# Patient Record
Sex: FEMALE | URBAN - METROPOLITAN AREA
[De-identification: names, ages, dates, MRNs, and addresses within clinical notes are randomized per-mention and may not be internally consistent; named-entity substitution may affect disease eponyms.]

---

## 2021-08-28 ENCOUNTER — TELEPHONE ENCOUNTER (OUTPATIENT)
Dept: URBAN - METROPOLITAN AREA CLINIC 13 | Facility: CLINIC | Age: 46
End: 2021-08-28

## 2021-08-29 ENCOUNTER — TELEPHONE ENCOUNTER (OUTPATIENT)
Dept: URBAN - METROPOLITAN AREA CLINIC 13 | Facility: CLINIC | Age: 46
End: 2021-08-29

## 2025-02-10 ENCOUNTER — LAB OUTSIDE AN ENCOUNTER (OUTPATIENT)
Dept: URBAN - METROPOLITAN AREA CLINIC 115 | Facility: CLINIC | Age: 50
End: 2025-02-10

## 2025-02-10 ENCOUNTER — TELEPHONE ENCOUNTER (OUTPATIENT)
Dept: URBAN - METROPOLITAN AREA CLINIC 115 | Facility: CLINIC | Age: 50
End: 2025-02-10

## 2025-02-18 ENCOUNTER — TELEPHONE ENCOUNTER (OUTPATIENT)
Dept: URBAN - METROPOLITAN AREA CLINIC 54 | Facility: CLINIC | Age: 50
End: 2025-02-18

## 2025-02-21 ENCOUNTER — OFFICE VISIT (OUTPATIENT)
Dept: URBAN - METROPOLITAN AREA MEDICAL CENTER 24 | Facility: MEDICAL CENTER | Age: 50
End: 2025-02-21

## 2025-02-24 ENCOUNTER — TELEPHONE ENCOUNTER (OUTPATIENT)
Dept: URBAN - METROPOLITAN AREA CLINIC 115 | Facility: CLINIC | Age: 50
End: 2025-02-24

## 2025-02-28 ENCOUNTER — DASHBOARD ENCOUNTERS (OUTPATIENT)
Age: 50
End: 2025-02-28

## 2025-02-28 ENCOUNTER — LAB OUTSIDE AN ENCOUNTER (OUTPATIENT)
Dept: URBAN - METROPOLITAN AREA CLINIC 115 | Facility: CLINIC | Age: 50
End: 2025-02-28

## 2025-02-28 ENCOUNTER — TELEPHONE ENCOUNTER (OUTPATIENT)
Dept: URBAN - METROPOLITAN AREA CLINIC 115 | Facility: CLINIC | Age: 50
End: 2025-02-28

## 2025-02-28 ENCOUNTER — OFFICE VISIT (OUTPATIENT)
Dept: URBAN - METROPOLITAN AREA CLINIC 115 | Facility: CLINIC | Age: 50
End: 2025-02-28

## 2025-02-28 VITALS
HEART RATE: 83 BPM | DIASTOLIC BLOOD PRESSURE: 73 MMHG | WEIGHT: 132.4 LBS | HEIGHT: 64 IN | BODY MASS INDEX: 22.61 KG/M2 | SYSTOLIC BLOOD PRESSURE: 123 MMHG | TEMPERATURE: 97.5 F

## 2025-02-28 PROBLEM — 1082431000119108: Status: ACTIVE | Noted: 2025-02-28

## 2025-02-28 RX ORDER — MELOXICAM 15 MG/1
TAKE 1 TABLET BY MOUTH EVERY OTHER MORNING WITH FOOD AS NEEDED TABLET ORAL
Qty: 15 EACH | Refills: 2 | Status: ACTIVE | COMMUNITY

## 2025-02-28 RX ORDER — ALPRAZOLAM 0.5 MG/1
TABLET ORAL
Qty: 60 TABLET | Status: ACTIVE | COMMUNITY

## 2025-02-28 RX ORDER — OXYCODONE HYDROCHLORIDE 20 MG/1
TAKE 1/2 TABLET BY MOUTH EVERY 6 HOURS AS NEEDED FOR BREAKTHROUGH PAIN TABLET ORAL
Qty: 60 EACH | Refills: 0 | Status: ACTIVE | COMMUNITY

## 2025-02-28 RX ORDER — MORPHINE SULFATE 30 MG/1
TAKE 2 TABLETS BY MOUTH IN THE MORNING AND 1 IN THE EVENING TABLET, FILM COATED, EXTENDED RELEASE ORAL
Qty: 90 EACH | Refills: 0 | Status: ACTIVE | COMMUNITY

## 2025-02-28 RX ORDER — AMLODIPINE BESYLATE 5 MG/1
TAKE 1 TABLET BY MOUTH EVERY MORNING FOR BLOOD PRESSURE TABLET ORAL
Qty: 90 EACH | Refills: 0 | Status: ACTIVE | COMMUNITY

## 2025-02-28 RX ORDER — LEVETIRACETAM 500 MG/1
TAKE 1 AND 1/2 TABLETS BY MOUTH TWICE DAILY TABLET, FILM COATED ORAL
Qty: 270 EACH | Refills: 2 | Status: ACTIVE | COMMUNITY

## 2025-02-28 RX ORDER — MELOXICAM 15 MG/1
TABLET ORAL
Qty: 15 TABLET | Status: ACTIVE | COMMUNITY

## 2025-02-28 NOTE — HPI-TODAY'S VISIT:
Chr pain, reports worse epig pain since procedure. s/p EUS and ERCP w Dr. 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 PD 3mm, CBD 10mm. 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

## 2025-03-04 ENCOUNTER — TELEPHONE ENCOUNTER (OUTPATIENT)
Dept: URBAN - METROPOLITAN AREA MEDICAL CENTER 28 | Facility: MEDICAL CENTER | Age: 50
End: 2025-03-04

## 2025-03-27 ENCOUNTER — OFFICE VISIT (OUTPATIENT)
Dept: URBAN - METROPOLITAN AREA MEDICAL CENTER 24 | Facility: MEDICAL CENTER | Age: 50
End: 2025-03-27

## 2025-03-31 ENCOUNTER — TELEPHONE ENCOUNTER (OUTPATIENT)
Dept: URBAN - METROPOLITAN AREA CLINIC 115 | Facility: CLINIC | Age: 50
End: 2025-03-31

## 2025-04-01 ENCOUNTER — OFFICE VISIT (OUTPATIENT)
Dept: URBAN - METROPOLITAN AREA CLINIC 115 | Facility: CLINIC | Age: 50
End: 2025-04-01
Payer: OTHER GOVERNMENT

## 2025-04-01 DIAGNOSIS — R10.13 EPIGASTRIC PAIN: ICD-10-CM

## 2025-04-01 DIAGNOSIS — G89.29 OTHER CHRONIC PAIN: ICD-10-CM

## 2025-04-01 PROBLEM — 82423001: Status: ACTIVE | Noted: 2025-04-01

## 2025-04-01 PROCEDURE — 99213 OFFICE O/P EST LOW 20 MIN: CPT | Performed by: INTERNAL MEDICINE

## 2025-04-01 RX ORDER — OXYCODONE HYDROCHLORIDE 20 MG/1
TAKE 1/2 TABLET BY MOUTH EVERY 6 HOURS AS NEEDED FOR BREAKTHROUGH PAIN TABLET ORAL
Qty: 60 EACH | Refills: 0 | Status: ACTIVE | COMMUNITY

## 2025-04-01 RX ORDER — MORPHINE SULFATE 30 MG/1
TAKE 2 TABLETS BY MOUTH IN THE MORNING AND 1 IN THE EVENING TABLET, FILM COATED, EXTENDED RELEASE ORAL
Qty: 90 EACH | Refills: 0 | Status: ACTIVE | COMMUNITY

## 2025-04-01 RX ORDER — AMLODIPINE BESYLATE 5 MG/1
TAKE 1 TABLET BY MOUTH EVERY MORNING FOR BLOOD PRESSURE TABLET ORAL
Qty: 90 EACH | Refills: 0 | Status: ACTIVE | COMMUNITY

## 2025-04-01 RX ORDER — MELOXICAM 15 MG/1
TAKE 1 TABLET BY MOUTH EVERY OTHER MORNING WITH FOOD AS NEEDED TABLET ORAL
Qty: 15 EACH | Refills: 2 | Status: ACTIVE | COMMUNITY

## 2025-04-01 RX ORDER — ALPRAZOLAM 0.5 MG/1
TABLET ORAL
Qty: 60 TABLET | Status: ACTIVE | COMMUNITY

## 2025-04-01 RX ORDER — MELOXICAM 15 MG/1
TABLET ORAL
Qty: 15 TABLET | Status: ACTIVE | COMMUNITY

## 2025-04-01 RX ORDER — LEVETIRACETAM 500 MG/1
TAKE 1 AND 1/2 TABLETS BY MOUTH TWICE DAILY TABLET, FILM COATED ORAL
Qty: 270 EACH | Refills: 2 | Status: ACTIVE | COMMUNITY

## 2025-04-01 NOTE — HPI-TODAY'S VISIT:
ERCP 3/27/29 w julio Sarmiento CBD stones removed, stents removed, ampulla bx inflammation only, benign. Prior hx: Chr pain, reports worse epig pain since procedure. s/p EUS and ERCP w Dr. 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 PD 3mm, CBD 10mm. 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

## 2025-04-02 LAB
A/G RATIO: 1.5
ALBUMIN: 4.4
ALKALINE PHOSPHATASE: 161
ALT (SGPT): 15
AST (SGOT): 13
BILIRUBIN, TOTAL: 0.6
BUN/CREATININE RATIO: (no result)
BUN: 12
CALCIUM: 10.1
CARBON DIOXIDE, TOTAL: 28
CHLORIDE: 101
CREATININE: 0.81
EGFR: 88
GLOBULIN, TOTAL: 3
GLUCOSE: 84
HEMATOCRIT: 38.8
HEMOGLOBIN: 13
LIPASE: 9
MCH: 29.5
MCHC: 33.5
MCV: 88
MPV: 10
PLATELET COUNT: 412
POTASSIUM: 4
PROTEIN, TOTAL: 7.4
RDW: 12.7
RED BLOOD CELL COUNT: 4.41
SODIUM: 139
WHITE BLOOD CELL COUNT: 9.2

## 2025-04-04 ENCOUNTER — TELEPHONE ENCOUNTER (OUTPATIENT)
Dept: URBAN - METROPOLITAN AREA CLINIC 115 | Facility: CLINIC | Age: 50
End: 2025-04-04

## 2025-05-23 ENCOUNTER — OFFICE VISIT (OUTPATIENT)
Dept: URBAN - METROPOLITAN AREA CLINIC 82 | Facility: CLINIC | Age: 50
End: 2025-05-23

## 2025-05-23 RX ORDER — AMLODIPINE BESYLATE 5 MG/1
TAKE 1 TABLET BY MOUTH EVERY MORNING FOR BLOOD PRESSURE TABLET ORAL
Qty: 90 EACH | Refills: 0 | Status: ACTIVE | COMMUNITY

## 2025-05-23 RX ORDER — MELOXICAM 15 MG/1
TAKE 1 TABLET BY MOUTH EVERY OTHER MORNING WITH FOOD AS NEEDED TABLET ORAL
Qty: 15 EACH | Refills: 2 | Status: ACTIVE | COMMUNITY

## 2025-05-23 RX ORDER — ALPRAZOLAM 0.5 MG/1
TABLET ORAL
Qty: 60 TABLET | Status: ACTIVE | COMMUNITY

## 2025-05-23 RX ORDER — MELOXICAM 15 MG/1
TABLET ORAL
Qty: 15 TABLET | Status: ACTIVE | COMMUNITY

## 2025-05-23 RX ORDER — OXYCODONE HYDROCHLORIDE 20 MG/1
TAKE 1/2 TABLET BY MOUTH EVERY 6 HOURS AS NEEDED FOR BREAKTHROUGH PAIN TABLET ORAL
Qty: 60 EACH | Refills: 0 | Status: ACTIVE | COMMUNITY

## 2025-05-23 RX ORDER — MORPHINE SULFATE 30 MG/1
TAKE 2 TABLETS BY MOUTH IN THE MORNING AND 1 IN THE EVENING TABLET, FILM COATED, EXTENDED RELEASE ORAL
Qty: 90 EACH | Refills: 0 | Status: ACTIVE | COMMUNITY

## 2025-05-23 RX ORDER — LEVETIRACETAM 500 MG/1
TAKE 1 AND 1/2 TABLETS BY MOUTH TWICE DAILY TABLET, FILM COATED ORAL
Qty: 270 EACH | Refills: 2 | Status: ACTIVE | COMMUNITY

## 2025-06-10 ENCOUNTER — OFFICE VISIT (OUTPATIENT)
Dept: URBAN - METROPOLITAN AREA CLINIC 115 | Facility: CLINIC | Age: 50
End: 2025-06-10
Payer: OTHER GOVERNMENT

## 2025-06-10 ENCOUNTER — LAB OUTSIDE AN ENCOUNTER (OUTPATIENT)
Dept: URBAN - METROPOLITAN AREA CLINIC 115 | Facility: CLINIC | Age: 50
End: 2025-06-10

## 2025-06-10 DIAGNOSIS — R74.8 ELEVATED ALKALINE PHOSPHATASE LEVEL: ICD-10-CM

## 2025-06-10 DIAGNOSIS — K83.8 DILATED CBD, ACQUIRED: ICD-10-CM

## 2025-06-10 DIAGNOSIS — Z12.11 COLON CANCER SCREENING: ICD-10-CM

## 2025-06-10 DIAGNOSIS — R93.5 ABNORMAL MRI OF ABDOMEN: ICD-10-CM

## 2025-06-10 DIAGNOSIS — G62.9 NEUROPATHY: ICD-10-CM

## 2025-06-10 DIAGNOSIS — R10.13 EPIGASTRIC PAIN: ICD-10-CM

## 2025-06-10 PROBLEM — 386033004: Status: ACTIVE | Noted: 2025-06-10

## 2025-06-10 PROCEDURE — 99213 OFFICE O/P EST LOW 20 MIN: CPT | Performed by: INTERNAL MEDICINE

## 2025-06-10 RX ORDER — MELOXICAM 15 MG/1
TABLET ORAL
Qty: 15 TABLET | COMMUNITY

## 2025-06-10 RX ORDER — ALPRAZOLAM 0.5 MG/1
TABLET ORAL
Qty: 60 TABLET | COMMUNITY

## 2025-06-10 RX ORDER — AMITRIPTYLINE HYDROCHLORIDE 25 MG/1
1 TABLET AT BEDTIME TABLET, FILM COATED ORAL ONCE A DAY
Qty: 30 | Refills: 1 | OUTPATIENT
Start: 2025-06-10

## 2025-06-10 RX ORDER — OXYCODONE HYDROCHLORIDE 20 MG/1
TAKE 1/2 TABLET BY MOUTH EVERY 6 HOURS AS NEEDED FOR BREAKTHROUGH PAIN TABLET ORAL
Qty: 60 EACH | Refills: 0 | COMMUNITY

## 2025-06-10 RX ORDER — MELOXICAM 15 MG/1
TAKE 1 TABLET BY MOUTH EVERY OTHER MORNING WITH FOOD AS NEEDED TABLET ORAL
Qty: 15 EACH | Refills: 2 | COMMUNITY

## 2025-06-10 RX ORDER — MORPHINE SULFATE 30 MG/1
TAKE 2 TABLETS BY MOUTH IN THE MORNING AND 1 IN THE EVENING TABLET, FILM COATED, EXTENDED RELEASE ORAL
Qty: 90 EACH | Refills: 0 | COMMUNITY

## 2025-06-10 RX ORDER — LEVETIRACETAM 500 MG/1
TAKE 1 AND 1/2 TABLETS BY MOUTH TWICE DAILY TABLET, FILM COATED ORAL
Qty: 270 EACH | Refills: 2 | COMMUNITY

## 2025-06-10 RX ORDER — SOD SULF/POT CHLORIDE/MAG SULF 1.479 G
12 TABLETS THE FIRST DOSE THE EVENING BEFORE AND SECOND DOSE THE MORNING OF COLONOSCOPY TABLET ORAL TWICE A DAY
Qty: 24 | OUTPATIENT
Start: 2025-06-10 | End: 2025-06-11

## 2025-06-10 RX ORDER — AMLODIPINE BESYLATE 5 MG/1
TAKE 1 TABLET BY MOUTH EVERY MORNING FOR BLOOD PRESSURE TABLET ORAL
Qty: 90 EACH | Refills: 0 | COMMUNITY

## 2025-06-10 NOTE — HPI-TODAY'S VISIT:
Psychiatric disease worse, seeing psychiatry. persistent periumb abd pain. sees pain mgmt for chr pains. tingling diffusely, neuropathy. Labs, slight elevation in alkaline phosphatase and platelets. ERCP 3/27/29 w julio Sarmiento CBD stones removed, stents removed, ampulla bx inflammation only, benign. Prior hx: Chr pain, reports worse epig pain since procedure. s/p EUS and ERCP w Dr. 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 PD 3mm, CBD 10mm. 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

## 2025-06-13 ENCOUNTER — OFFICE VISIT (OUTPATIENT)
Dept: URBAN - METROPOLITAN AREA SURGERY CENTER 13 | Facility: SURGERY CENTER | Age: 50
End: 2025-06-13

## 2025-06-13 ENCOUNTER — TELEPHONE ENCOUNTER (OUTPATIENT)
Dept: URBAN - METROPOLITAN AREA CLINIC 82 | Facility: CLINIC | Age: 50
End: 2025-06-13

## 2025-06-13 LAB
% SATURATION: 29
A/G RATIO: 1.7
ABSOLUTE BASOPHILS: 81
ABSOLUTE EOSINOPHILS: 298
ABSOLUTE LYMPHOCYTES: 2623
ABSOLUTE MONOCYTES: 546
ABSOLUTE NEUTROPHILS: 2654
ALBUMIN: 4.5
ALKALINE PHOSPHATASE: 73
ALKALINE PHOSPHATASE: 75
ALT (SGPT): 9
AST (SGOT): 17
BASOPHILS: 1.3
BILIRUBIN, TOTAL: 0.5
BONE ISOENZYMES: 34
BUN/CREATININE RATIO: (no result)
BUN: 20
CALCIUM: 9.5
CARBON DIOXIDE, TOTAL: 28
CHLORIDE: 101
CREATININE: 0.85
EGFR: 83
EOSINOPHILS: 4.8
FERRITIN: 83
FOLATE, SERUM: >24
GGT: 15
GLOBULIN, TOTAL: 2.7
GLUCOSE: 77
HEMATOCRIT: 35.4
HEMOGLOBIN A1C: 5.4
HEMOGLOBIN: 11.8
HEPATITIS B CORE AB TOTAL: (no result)
HEPATITIS B SURFACE AB IMMUNITY, QN: 24
HEPATITIS B SURFACE ANTIGEN: (no result)
IMMUNOGLOBULIN A, QN, SERUM: 129
INTERPRETATION: (no result)
INTESTINAL ISOENZYMES: 0
IRON BINDING CAPACITY: 333
IRON, TOTAL: 95
LIPASE: 7
LIVER ISOENZYMES: 66
LYMPHOCYTES: 42.3
MACROHEPATIC ISOENZYMES: 0
MCH: 29.2
MCHC: 33.3
MCV: 87.6
MITOCHONDRIAL (M2) ANTIBODY: <=20
MONOCYTES: 8.8
MPV: 10.7
NEUTROPHILS: 42.8
PLACENTAL ISOENZYMES: 0
PLATELET COUNT: 294
POTASSIUM: 3.6
PROTEIN, TOTAL: 7.2
RDW: 13.3
RED BLOOD CELL COUNT: 4.04
SODIUM: 140
T-TRANSGLUTAMINASE (TTG) IGA: <1
TSH W/REFLEX TO FT4: 1.05
VITAMIN B12: 337
VITAMIN D,25-OH,TOTAL,IA: 29
WHITE BLOOD CELL COUNT: 6.2

## 2025-06-13 RX ORDER — ONDANSETRON HYDROCHLORIDE 4 MG/1
1 TABLET TABLET, FILM COATED ORAL
Qty: 30 | OUTPATIENT
Start: 2025-06-13

## 2025-06-19 ENCOUNTER — TELEPHONE ENCOUNTER (OUTPATIENT)
Dept: URBAN - METROPOLITAN AREA CLINIC 82 | Facility: CLINIC | Age: 50
End: 2025-06-19

## 2025-07-14 ENCOUNTER — TELEPHONE ENCOUNTER (OUTPATIENT)
Dept: URBAN - METROPOLITAN AREA CLINIC 115 | Facility: CLINIC | Age: 50
End: 2025-07-14

## 2025-07-15 ENCOUNTER — TELEPHONE ENCOUNTER (OUTPATIENT)
Dept: URBAN - METROPOLITAN AREA CLINIC 115 | Facility: CLINIC | Age: 50
End: 2025-07-15

## 2025-07-16 ENCOUNTER — LAB OUTSIDE AN ENCOUNTER (OUTPATIENT)
Dept: URBAN - METROPOLITAN AREA CLINIC 115 | Facility: CLINIC | Age: 50
End: 2025-07-16

## 2025-07-25 ENCOUNTER — TELEPHONE ENCOUNTER (OUTPATIENT)
Dept: URBAN - METROPOLITAN AREA CLINIC 115 | Facility: CLINIC | Age: 50
End: 2025-07-25

## 2025-08-18 ENCOUNTER — OFFICE VISIT (OUTPATIENT)
Dept: URBAN - METROPOLITAN AREA MEDICAL CENTER 24 | Facility: MEDICAL CENTER | Age: 50
End: 2025-08-18

## 2025-08-18 RX ORDER — MELOXICAM 15 MG/1
TAKE 1 TABLET BY MOUTH EVERY OTHER MORNING WITH FOOD AS NEEDED TABLET ORAL
Qty: 15 EACH | Refills: 2 | COMMUNITY

## 2025-08-18 RX ORDER — MELOXICAM 15 MG/1
TABLET ORAL
Qty: 15 TABLET | COMMUNITY

## 2025-08-18 RX ORDER — OXYCODONE HYDROCHLORIDE 20 MG/1
TAKE 1/2 TABLET BY MOUTH EVERY 6 HOURS AS NEEDED FOR BREAKTHROUGH PAIN TABLET ORAL
Qty: 60 EACH | Refills: 0 | COMMUNITY

## 2025-08-18 RX ORDER — ONDANSETRON HYDROCHLORIDE 4 MG/1
1 TABLET TABLET, FILM COATED ORAL
Qty: 30 | Status: ACTIVE | COMMUNITY
Start: 2025-06-13

## 2025-08-18 RX ORDER — MORPHINE SULFATE 30 MG/1
TAKE 2 TABLETS BY MOUTH IN THE MORNING AND 1 IN THE EVENING TABLET, FILM COATED, EXTENDED RELEASE ORAL
Qty: 90 EACH | Refills: 0 | COMMUNITY

## 2025-08-18 RX ORDER — LEVETIRACETAM 500 MG/1
TAKE 1 AND 1/2 TABLETS BY MOUTH TWICE DAILY TABLET, FILM COATED ORAL
Qty: 270 EACH | Refills: 2 | COMMUNITY

## 2025-08-18 RX ORDER — AMLODIPINE BESYLATE 5 MG/1
TAKE 1 TABLET BY MOUTH EVERY MORNING FOR BLOOD PRESSURE TABLET ORAL
Qty: 90 EACH | Refills: 0 | COMMUNITY

## 2025-08-18 RX ORDER — ALPRAZOLAM 0.5 MG/1
TABLET ORAL
Qty: 60 TABLET | COMMUNITY

## 2025-08-18 RX ORDER — AMITRIPTYLINE HYDROCHLORIDE 25 MG/1
1 TABLET AT BEDTIME TABLET, FILM COATED ORAL ONCE A DAY
Qty: 30 | Refills: 1 | Status: ACTIVE | COMMUNITY
Start: 2025-06-10

## 2025-08-21 ENCOUNTER — TELEPHONE ENCOUNTER (OUTPATIENT)
Dept: URBAN - METROPOLITAN AREA CLINIC 115 | Facility: CLINIC | Age: 50
End: 2025-08-21